# Patient Record
(demographics unavailable — no encounter records)

---

## 2025-01-30 NOTE — HEALTH RISK ASSESSMENT
[0] : 2) Feeling down, depressed, or hopeless: Not at all (0) [PHQ-2 Negative - No further assessment needed] : PHQ-2 Negative - No further assessment needed [Never] : Never [Yes] : Yes [2 - 3 times a week (3 pts)] : 2 - 3  times a week (3 points) [1 or 2 (0 pts)] : 1 or 2 (0 points) [Never (0 pts)] : Never (0 points) [No] : In the past 12 months have you used drugs other than those required for medical reasons? No [No falls in past year] : Patient reported no falls in the past year [Employed] : employed [] :  [Audit-CScore] : 3 [de-identified] : 2-3x/wk of walking, stretching. infreq treadmill.  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

## 2025-01-30 NOTE — PHYSICAL EXAM
[Normal Sclera/Conjunctiva] : normal sclera/conjunctiva [PERRL] : pupils equal round and reactive to light [No Lymphadenopathy] : no lymphadenopathy [No Edema] : there was no peripheral edema [Soft] : abdomen soft [Non Tender] : non-tender [Non-distended] : non-distended [No Masses] : no abdominal mass palpated [No HSM] : no HSM [Coordination Grossly Intact] : coordination grossly intact [No Focal Deficits] : no focal deficits [Normal] : affect was normal and insight and judgment were intact [Prostate Tenderness] : the prostate was not tender [No Prostate Nodules] : no prostate nodules

## 2025-01-30 NOTE — HEALTH RISK ASSESSMENT
[0] : 2) Feeling down, depressed, or hopeless: Not at all (0) [PHQ-2 Negative - No further assessment needed] : PHQ-2 Negative - No further assessment needed [Never] : Never [Yes] : Yes [2 - 3 times a week (3 pts)] : 2 - 3  times a week (3 points) [1 or 2 (0 pts)] : 1 or 2 (0 points) [Never (0 pts)] : Never (0 points) [No] : In the past 12 months have you used drugs other than those required for medical reasons? No [No falls in past year] : Patient reported no falls in the past year [Employed] : employed [] :  [Audit-CScore] : 3 [de-identified] : 2-3x/wk of walking, stretching. infreq treadmill.  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

## 2025-01-30 NOTE — HISTORY OF PRESENT ILLNESS
[FreeTextEntry1] : Pt is here to establish care, low libido PHQ-2(0PT) [de-identified] : here to est care with new PCP.  last 2-3 years, sex drive has dropped a lot. erections also more difficult, much harder to maintain, also not as hard. slowly worsening. both have proceeded around same time frame. no other changes noted in body other than slow weight gain. 15# in last 20 years. still notices people on the street. masturbates on occasion, erection issues same.  monogamous M partner x 34 years, no relationship problems.  tried tadalafil 5mg 1/4 tab, does help. too much flushing and gets HA with higher doses.  rare AM erections.  1x nocturia

## 2025-01-30 NOTE — COUNSELING
Ventricular Rate : 106   Atrial Rate : 106   P-R Interval : 163   QRS Duration : 115   Q-T Interval : 335   QTC Calculation(Bezet) : 445   P Axis : -82   R Axis : -56   T Axis : 106   Diagnosis : Normal sinus rhythm~LVH with IVCD, LAD and secondary repol abnrm~Anterior ST elevation, probably due to LVH~~Confirmed by Jarred Garcia ([id number]) on 1/22/2018 1:41:54 PM      [Benefits of weight loss discussed] : Benefits of weight loss discussed [Encouraged to increase physical activity] : Encouraged to increase physical activity

## 2025-01-30 NOTE — HISTORY OF PRESENT ILLNESS
[FreeTextEntry1] : Pt is here to establish care, low libido PHQ-2(0PT) [de-identified] : here to est care with new PCP.  last 2-3 years, sex drive has dropped a lot. erections also more difficult, much harder to maintain, also not as hard. slowly worsening. both have proceeded around same time frame. no other changes noted in body other than slow weight gain. 15# in last 20 years. still notices people on the street. masturbates on occasion, erection issues same.  monogamous M partner x 34 years, no relationship problems.  tried tadalafil 5mg 1/4 tab, does help. too much flushing and gets HA with higher doses.  rare AM erections.  1x nocturia

## 2025-01-30 NOTE — ASSESSMENT
[FreeTextEntry1] : disc RBA of prostate CA screening. check baseline PSA today, then prn. pt not interested in yearly screening based on shared decision making

## 2025-02-20 NOTE — PHYSICAL EXAM
[Pedal Pulses Present] : the pedal pulses are present [No Edema] : there was no peripheral edema [Normal] : no CVA or spinal tenderness

## 2025-02-21 NOTE — HISTORY OF PRESENT ILLNESS
[FreeTextEntry1] : hep b vaccine PHQ2:0  [de-identified] : 59 y/o M w/ low libido and ED presents to clinic for follow up for elevated Cr and Hep B vaccine. Started on taladifil last visit and enjoys being on it. No new sexual partners and declines new STI testing. Otherwise feels well.

## 2025-02-21 NOTE — HISTORY OF PRESENT ILLNESS
[FreeTextEntry1] : hep b vaccine PHQ2:0  [de-identified] : 61 y/o M w/ low libido and ED presents to clinic for follow up for elevated Cr and Hep B vaccine. Started on taladifil last visit and enjoys being on it. No new sexual partners and declines new STI testing. Otherwise feels well.

## 2025-02-21 NOTE — HEALTH RISK ASSESSMENT
[0] : 2) Feeling down, depressed, or hopeless: Not at all (0) [PHQ-2 Negative - No further assessment needed] : PHQ-2 Negative - No further assessment needed [Never] : Never [No] : No [[id number]] : 0

## 2025-02-21 NOTE — HEALTH RISK ASSESSMENT
[0] : 2) Feeling down, depressed, or hopeless: Not at all (0) [PHQ-2 Negative - No further assessment needed] : PHQ-2 Negative - No further assessment needed [Never] : Never [No] : No [[id number]] : 0

## 2025-02-21 NOTE — REVIEW OF SYSTEMS
[Negative] : Gastrointestinal [FreeTextEntry2] : Feels flushed after taking tadalfil but otherwise JOESPH.

## 2025-03-31 NOTE — HISTORY OF PRESENT ILLNESS
[FreeTextEntry1] : Reason for visit: CKD  HPI 59 y/o M with no significant medical history of nephrolithiasis here today for evaluation of CKD. Patient recently established care with Dr. Lindsey. CR was noted to be 1.3 and no major electrolyte disturbances. No albuminuria was noted. Patient prior to this was under care of another PCP and was seeing them periodically. States that he could get us those record but was never told that his CR was abnormal. Patient has no family history of renal disease. He does not take any NSAID or any herbal supplements. Patient does have history of nephrolithiasis. He states that he has symptomatic stone event about 4 years ago. He was told on a follow up renal US that he had some more stones in his kidney.

## 2025-03-31 NOTE — REVIEW OF SYSTEMS
[Fever] : no fever [Chills] : no chills [Heart Rate Is Slow] : the heart rate was not slow [Heart Rate Is Fast] : the heart rate was not fast [Chest Pain] : no chest pain [Palpitations] : no palpitations [Shortness Of Breath] : no shortness of breath [Wheezing] : no wheezing [Cough] : no cough [SOB on Exertion] : no shortness of breath during exertion [Abdominal Pain] : no abdominal pain [Vomiting] : no vomiting

## 2025-03-31 NOTE — ASSESSMENT
[FreeTextEntry1] : #CKD stage II B -> CR 1.3. Previous CR trend unknown. No albuminuria. Will obtain Renal US.  -> Cause of CKD: No major risk factors of CKD noted. Repeat UA to make sure no signs of active disease.  -> Will obtain CKD labs a week before his next visit.   #History of nephrolithaisis -> Obtain renal US.  -> If presence of stones will order Litholink.  -> General measures like hydration, low salt were discussed today.

## 2025-07-21 NOTE — PHYSICAL EXAM
[Normal] : no acute distress, well nourished, well developed and well-appearing [de-identified] : televideo

## 2025-07-21 NOTE — HISTORY OF PRESENT ILLNESS
[FreeTextEntry1] : chol f/u [de-identified] : eating much better. yogurt and fresh fruit for breakfast lunch: whole wheat toast, hummus, turkey dinners: more veggies, protein.  occ pizza. no processed foods, sweets, candy, diet sodas.  70% of meals are within "good diet." 100% healthy at breakfast and lunch, less consistent with dinner  changes feel easy and sustainable. partner also eats healthy. exercise: more walking, more running. 4 days a week of something: tennis, running, fast walking 3-5 miles.  endurance has improved a bit. thinks 4-5# down. energy about same.  above changes started in March.

## 2025-07-21 NOTE — HISTORY OF PRESENT ILLNESS
[FreeTextEntry1] : chol f/u [de-identified] : eating much better. yogurt and fresh fruit for breakfast lunch: whole wheat toast, hummus, turkey dinners: more veggies, protein.  occ pizza. no processed foods, sweets, candy, diet sodas.  70% of meals are within "good diet." 100% healthy at breakfast and lunch, less consistent with dinner  changes feel easy and sustainable. partner also eats healthy. exercise: more walking, more running. 4 days a week of something: tennis, running, fast walking 3-5 miles.  endurance has improved a bit. thinks 4-5# down. energy about same.  above changes started in March.